# Patient Record
Sex: MALE | Race: OTHER | NOT HISPANIC OR LATINO | URBAN - METROPOLITAN AREA
[De-identification: names, ages, dates, MRNs, and addresses within clinical notes are randomized per-mention and may not be internally consistent; named-entity substitution may affect disease eponyms.]

---

## 2021-05-25 ENCOUNTER — EMERGENCY (EMERGENCY)
Facility: HOSPITAL | Age: 61
LOS: 1 days | Discharge: HOME | End: 2021-05-25
Attending: EMERGENCY MEDICINE
Payer: COMMERCIAL

## 2021-05-25 VITALS
HEART RATE: 87 BPM | TEMPERATURE: 98 F | DIASTOLIC BLOOD PRESSURE: 80 MMHG | SYSTOLIC BLOOD PRESSURE: 155 MMHG | RESPIRATION RATE: 18 BRPM

## 2021-05-25 VITALS
OXYGEN SATURATION: 96 % | HEART RATE: 90 BPM | SYSTOLIC BLOOD PRESSURE: 138 MMHG | TEMPERATURE: 98 F | DIASTOLIC BLOOD PRESSURE: 79 MMHG | RESPIRATION RATE: 16 BRPM

## 2021-05-25 PROCEDURE — 73590 X-RAY EXAM OF LOWER LEG: CPT

## 2021-05-25 PROCEDURE — 12035 INTMD RPR S/A/T/EXT 12.6-20: CPT

## 2021-05-25 PROCEDURE — 73590 X-RAY EXAM OF LOWER LEG: CPT | Mod: 26,LT

## 2021-05-25 PROCEDURE — 90715 TDAP VACCINE 7 YRS/> IM: CPT

## 2021-05-25 PROCEDURE — 90471 IMMUNIZATION ADMIN: CPT

## 2021-05-25 PROCEDURE — 99283 EMERGENCY DEPT VISIT LOW MDM: CPT | Mod: 25

## 2021-05-25 PROCEDURE — 99284 EMERGENCY DEPT VISIT MOD MDM: CPT | Mod: 25

## 2021-05-25 PROCEDURE — 12005 RPR S/N/A/GEN/TRK12.6-20.0CM: CPT

## 2021-05-25 RX ORDER — OXYCODONE HYDROCHLORIDE 5 MG/1
5 TABLET ORAL ONCE
Refills: 0 | Status: DISCONTINUED | OUTPATIENT
Start: 2021-05-25 | End: 2021-05-25

## 2021-05-25 RX ORDER — TETANUS TOXOID, REDUCED DIPHTHERIA TOXOID AND ACELLULAR PERTUSSIS VACCINE, ADSORBED 5; 2.5; 8; 8; 2.5 [IU]/.5ML; [IU]/.5ML; UG/.5ML; UG/.5ML; UG/.5ML
0.5 SUSPENSION INTRAMUSCULAR ONCE
Refills: 0 | Status: COMPLETED | OUTPATIENT
Start: 2021-05-25 | End: 2021-05-25

## 2021-05-25 RX ADMIN — OXYCODONE HYDROCHLORIDE 5 MILLIGRAM(S): 5 TABLET ORAL at 13:26

## 2021-05-25 RX ADMIN — TETANUS TOXOID, REDUCED DIPHTHERIA TOXOID AND ACELLULAR PERTUSSIS VACCINE, ADSORBED 0.5 MILLILITER(S): 5; 2.5; 8; 8; 2.5 SUSPENSION INTRAMUSCULAR at 13:26

## 2021-05-25 NOTE — ED PROVIDER NOTE - PROGRESS NOTE DETAILS
Fran, PGY2: lac repair completed. able to ambulate with cane. return precautions given and pt ok with dispo home.

## 2021-05-25 NOTE — ED ADULT NURSE NOTE - OBJECTIVE STATEMENT
60 year old male pt presented to the ED via ems, with EMS stating laceration to left shin,  [id number] [place] chan speaking, with pt stating pt was on a ladder pt slipped caught his fall causing a laceration/avulsion to left shin, pt denies any numbness or tingling to site, no swelling, pt denies any blood thinners, bleeding controlled, pt denies hitting head or LOC, +ROM, denies any pain, pt is ambulatory , pt is A&OX3 and is ambulatory

## 2021-05-25 NOTE — ED PROVIDER NOTE - OBJECTIVE STATEMENT
59y/o M w/ h/o HTN, HLD BIBEMS for fall off ladder. Pt states 45min prior to arrival that he was on ladder which slide and his L leg got cut. Denies numbness, tingling, chest pain, back pain, head trauma, LOC, n/v, anticoagulation.

## 2021-05-25 NOTE — ED PROVIDER NOTE - NSFOLLOWUPCLINICS_GEN_ALL_ED_FT
Pediatric Plastic Surgery  Pediatric Plastic Surgery  [address] [place] [address]  Phone: [phone]  Fax: [phone]  Follow Up Time: 7-10 Days

## 2021-05-25 NOTE — ED PROCEDURE NOTE - ATTENDING CONTRIBUTION TO CARE
I have participated in and supervised all key portions of the above procedures and agree with the above documentation. JOSE RAMON Garcia MD

## 2021-05-25 NOTE — ED PROVIDER NOTE - CLINICAL SUMMARY MEDICAL DECISION MAKING FREE TEXT BOX
59y/o M w/ h/o HTN, HLD BIBEMS for fall off ladder, laceration L anterior leg no other injuries. Will XR r/o fx, foreign body, irrigate wound, tetanus and lac repair w/ plastic follow up.

## 2021-05-25 NOTE — ED PROVIDER NOTE - PATIENT PORTAL LINK FT
You can access the FollowMyHealth Patient Portal offered by [facility] by registering at the following website: [email or web address]. By joining [employer]’s FollowMyHealth portal, you will also be able to view your health information using other applications (apps) compatible with our system.

## 2021-05-25 NOTE — ED ADULT NURSE NOTE - CHIEF COMPLAINT QUOTE
No LOC. Not on blood thinners. did not hit head. Large avulsion laceration left lower leg. Minimal bleeding at present. good ROM

## 2021-05-25 NOTE — ED PROVIDER NOTE - ATTENDING CONTRIBUTION TO CARE
Attending Statement (ISMAEL Garcia MD):    HPI: 61y/o M with h/o HTN, HLD, presenting for injury to the left leg after fall from ladder: patient states that he slipped/fell, cutting the left shin, but catching himself and preventing further fall/injury; denies falling onto ground or hitting head.  Able to walk on leg but was having a lot of bleeding from leg, prompting call to 911; arrives by EMS.    [employer]  used: ID # [id number]    Review of Systems:  -General: no fever or chills  -ENT: no congestion, no difficulty swallowing  -Pulmonary: no cough, no shortness of breath  -Cardiac: no chest pain, no palpitations  -Gastrointestinal: no abdominal pain, no nausea, no vomiting, and no diarrhea.  -Genitourinary: no blood or pain with urination  -Musculoskeletal: no back or neck pain  -Skin: no rashes  -Endocrine: No h/o diabetes or thyroid disease  -Neurologic: No focal weakness or numbness    All else negative unless otherwise specified elsewhere in this note.    PSH/PMH as noted above    On Physical Exam:  General: well appearing, in NAD, speaking clearly in full sentences and without difficulty; cooperative with exam  HEENT: airway patent  Skin: complex v shaped laceration on the left shin, visible muscle but no apparent muscular damage, minimal active bleeding  Extremities: LLE: laceration as noted above; has FROM of ankle/toes. Dp/pt pulses present  Neuro: no gross neurologic deficits    MDM:

## 2021-05-25 NOTE — ED PROVIDER NOTE - PHYSICAL EXAMINATION
Gen: NAD, non-toxic appearing  Head: normal appearing  HEENT: normal conjunctiva, oral mucosa moist  Lung: no respiratory distress, speaking in full sentences, clear to ascultation bilaterally     CV: regular rate and rhythm   Abd: soft, non distended, non tender   MSK:  no midline tenderness  Neuro: No focal deficits, AAOx3, 5/5 strength in all 4 extremities, sensation to light touch in tact   Skin: Warm  Psych: normal affect

## 2021-05-25 NOTE — ED ADULT TRIAGE NOTE - CHIEF COMPLAINT QUOTE
No LOC. Not on blood thinners. did not hit head No LOC. Not on blood thinners. did not hit head. Large avulsion laceration left lower leg. Minimal bleeding at present. good ROM

## 2021-05-25 NOTE — ED PROVIDER NOTE - NS ED ROS FT
GENERAL: No fever, no chills  EYES: no change in vision  HEENT: no trouble swallowing, no trouble speaking  CARDIAC: no chest pain  PULMONARY: no cough, no shortness of breath  GI: no abdominal pain, no nausea, no vomiting, no diarrhea, no constipation  : No dysuria, no frequency, no change in appearance, or odor of urine  SKIN: +laceration, no rashes  NEURO: no headache, no weakness  MSK: No joint pain

## 2021-06-05 ENCOUNTER — EMERGENCY (EMERGENCY)
Facility: HOSPITAL | Age: 61
LOS: 1 days | Discharge: HOME | End: 2021-06-05
Attending: STUDENT IN AN ORGANIZED HEALTH CARE EDUCATION/TRAINING PROGRAM
Payer: COMMERCIAL

## 2021-06-05 VITALS
SYSTOLIC BLOOD PRESSURE: 146 MMHG | TEMPERATURE: 98 F | HEART RATE: 90 BPM | OXYGEN SATURATION: 97 % | RESPIRATION RATE: 15 BRPM | HEIGHT: 63 IN | DIASTOLIC BLOOD PRESSURE: 80 MMHG | WEIGHT: 119.93 LBS

## 2021-06-05 PROBLEM — I10 ESSENTIAL (PRIMARY) HYPERTENSION: Chronic | Status: ACTIVE | Noted: 2021-05-25

## 2021-06-05 PROBLEM — E78.5 HYPERLIPIDEMIA, UNSPECIFIED: Chronic | Status: ACTIVE | Noted: 2021-05-25

## 2021-06-05 PROCEDURE — 99281 EMR DPT VST MAYX REQ PHY/QHP: CPT

## 2021-06-05 PROCEDURE — L9995: CPT

## 2021-06-05 NOTE — ED PROVIDER NOTE - NSFOLLOWUPINSTRUCTIONS_ED_ALL_ED_FT
Please follow up with PCP as soon as possible.     Please return to ED if you have fever, chills, worsening pain, bleeding or discharge from the wound.

## 2021-06-05 NOTE — ED ADULT NURSE REASSESSMENT NOTE - NS ED NURSE REASSESS COMMENT FT1
sutures to anterior left leg removed with pt and niece given instructions by md sruthi Barroso with addl bacitracin given by md for wound application after washing

## 2021-06-05 NOTE — ED ADULT NURSE NOTE - OBJECTIVE STATEMENT
pt 59 yo Mandarin speaking male presents for suture remoavl to anterior shin left leg placed here in er 5/25 per daughter pt has not changed dressing to site pt with v like wound with sutures present no purulent drainage pt afebrile skin warm dry  sccompsnied by daughter to er

## 2021-06-05 NOTE — ED PROVIDER NOTE - CLINICAL SUMMARY MEDICAL DECISION MAKING FREE TEXT BOX
Lula Del Real (DO): 60y M w/ PMHx of HLD and HTN presents to the ED s/p repair. Impressions: No signs of wound dehiscence or infection on exam. Plan: Suture removal and f/u w/ PCP. Lula Del Real (DO): 60y M w/ PMHx of HLD and HTN presents to the ED for suture removal. No signs of wound dehiscence or infection on exam. Plan: Suture removal and f/u w/ PCP.

## 2021-06-05 NOTE — ED PROVIDER NOTE - PATIENT PORTAL LINK FT
You can access the FollowMyHealth Patient Portal offered by [facility] by registering at the following website: [email or web address]. By joining [employer]’s FollowMyHealth portal, you will also be able to view your health information using other applications (apps) compatible with our system.

## 2021-06-05 NOTE — ED PROVIDER NOTE - OBJECTIVE STATEMENT
60y M w/ PMHx of HLD and HTN presents to the ED s/p fall on 5/25/21 sustaining a lac to the left leg. Had 23 sutures. Pt was sent to the ED today for suture removal. Pt states that the wound is healing well. Denies fevers, chills, discharge, or bleeding from the wound.

## 2023-01-01 NOTE — ED ADULT TRIAGE NOTE - NSWEIGHTCALCTOOLDRUG_GEN_A_CORE
used
You can access the FollowMyHealth Patient Portal offered by [facility] by registering at the following website: [email or web address]. By joining [employer]’s FollowMyHealth portal, you will also be able to view your health information using other applications (apps) compatible with our system.

## 2024-02-23 NOTE — ED PROVIDER NOTE - IV ALTEPLASE DOOR HIDDEN
Geena at uro office.    show